# Patient Record
Sex: FEMALE | Race: WHITE | Employment: PART TIME
[De-identification: names, ages, dates, MRNs, and addresses within clinical notes are randomized per-mention and may not be internally consistent; named-entity substitution may affect disease eponyms.]

---

## 2017-01-10 ENCOUNTER — PROCEDURE VISIT (OUTPATIENT)
Dept: OBGYN | Facility: CLINIC | Age: 47
End: 2017-01-10

## 2017-01-10 VITALS
HEART RATE: 74 BPM | RESPIRATION RATE: 19 BRPM | DIASTOLIC BLOOD PRESSURE: 72 MMHG | BODY MASS INDEX: 26.67 KG/M2 | WEIGHT: 156.2 LBS | TEMPERATURE: 98.1 F | HEIGHT: 64 IN | SYSTOLIC BLOOD PRESSURE: 111 MMHG

## 2017-01-10 DIAGNOSIS — N93.9 ABNORMAL UTERINE BLEEDING (AUB): Primary | ICD-10-CM

## 2017-01-10 LAB
CONTROL: NORMAL
PREGNANCY TEST URINE, POC: NEGATIVE

## 2017-01-10 PROCEDURE — 81025 URINE PREGNANCY TEST: CPT | Performed by: OBSTETRICS & GYNECOLOGY

## 2017-01-10 PROCEDURE — 58100 BIOPSY OF UTERUS LINING: CPT | Performed by: OBSTETRICS & GYNECOLOGY

## 2017-01-10 RX ORDER — MEDROXYPROGESTERONE ACETATE 150 MG/ML
150 INJECTION, SUSPENSION INTRAMUSCULAR
Status: SHIPPED | OUTPATIENT
Start: 2017-01-10

## 2017-01-24 ENCOUNTER — OFFICE VISIT (OUTPATIENT)
Dept: OBGYN | Facility: CLINIC | Age: 47
End: 2017-01-24

## 2017-01-24 VITALS
SYSTOLIC BLOOD PRESSURE: 131 MMHG | HEIGHT: 63 IN | HEART RATE: 69 BPM | BODY MASS INDEX: 27.91 KG/M2 | DIASTOLIC BLOOD PRESSURE: 81 MMHG | WEIGHT: 157.5 LBS

## 2017-01-24 DIAGNOSIS — N93.9 ABNORMAL UTERINE BLEEDING (AUB): Primary | ICD-10-CM

## 2017-01-24 PROCEDURE — 99213 OFFICE O/P EST LOW 20 MIN: CPT | Performed by: OBSTETRICS & GYNECOLOGY

## 2017-02-20 ENCOUNTER — TELEPHONE (OUTPATIENT)
Dept: OBGYN | Facility: CLINIC | Age: 47
End: 2017-02-20

## 2017-03-28 ENCOUNTER — NURSE ONLY (OUTPATIENT)
Dept: OBGYN | Age: 47
End: 2017-03-28
Payer: COMMERCIAL

## 2017-03-28 VITALS — BODY MASS INDEX: 27.31 KG/M2 | RESPIRATION RATE: 15 BRPM | HEIGHT: 64 IN | WEIGHT: 160 LBS

## 2017-03-28 DIAGNOSIS — N93.9 ABNORMAL UTERINE BLEEDING (AUB): Primary | ICD-10-CM

## 2017-03-28 PROCEDURE — 96372 THER/PROPH/DIAG INJ SC/IM: CPT | Performed by: OBSTETRICS & GYNECOLOGY

## 2017-03-28 RX ORDER — MEDROXYPROGESTERONE ACETATE 150 MG/ML
150 INJECTION, SUSPENSION INTRAMUSCULAR ONCE
Status: COMPLETED | OUTPATIENT
Start: 2017-03-28 | End: 2017-03-28

## 2017-03-28 RX ADMIN — MEDROXYPROGESTERONE ACETATE 150 MG: 150 INJECTION, SUSPENSION INTRAMUSCULAR at 10:21

## 2017-04-11 ENCOUNTER — TELEPHONE (OUTPATIENT)
Dept: OBGYN | Age: 47
End: 2017-04-11

## 2017-05-01 ENCOUNTER — OFFICE VISIT (OUTPATIENT)
Dept: OBGYN | Age: 47
End: 2017-05-01
Payer: COMMERCIAL

## 2017-05-01 VITALS
DIASTOLIC BLOOD PRESSURE: 72 MMHG | BODY MASS INDEX: 26.4 KG/M2 | WEIGHT: 154.6 LBS | RESPIRATION RATE: 19 BRPM | TEMPERATURE: 98.4 F | SYSTOLIC BLOOD PRESSURE: 111 MMHG | HEIGHT: 64 IN | HEART RATE: 72 BPM

## 2017-05-01 DIAGNOSIS — N93.9 ABNORMAL UTERINE BLEEDING (AUB): Primary | ICD-10-CM

## 2017-05-01 PROCEDURE — 99213 OFFICE O/P EST LOW 20 MIN: CPT | Performed by: OBSTETRICS & GYNECOLOGY

## 2017-05-01 RX ORDER — CITALOPRAM 10 MG/1
10 TABLET ORAL
COMMUNITY
Start: 2017-02-16

## 2018-01-22 ENCOUNTER — HOSPITAL ENCOUNTER (OUTPATIENT)
Dept: WOMENS IMAGING | Age: 48
Discharge: HOME | End: 2018-01-22
Payer: COMMERCIAL

## 2018-01-22 DIAGNOSIS — Z12.31 VISIT FOR SCREENING MAMMOGRAM: ICD-10-CM

## 2018-01-22 PROCEDURE — 77063 BREAST TOMOSYNTHESIS BI: CPT

## 2019-02-25 ENCOUNTER — HOSPITAL ENCOUNTER (OUTPATIENT)
Dept: WOMENS IMAGING | Age: 49
Discharge: HOME | End: 2019-02-27
Payer: COMMERCIAL

## 2019-02-25 DIAGNOSIS — Z12.39 SCREENING BREAST EXAMINATION: ICD-10-CM

## 2019-02-25 PROCEDURE — 77067 SCR MAMMO BI INCL CAD: CPT

## 2019-03-26 ENCOUNTER — HOSPITAL ENCOUNTER (OUTPATIENT)
Dept: WOMENS IMAGING | Age: 49
Discharge: HOME | End: 2019-03-28
Payer: COMMERCIAL

## 2019-03-26 DIAGNOSIS — R92.8 ABNORMAL MAMMOGRAM: ICD-10-CM

## 2019-03-26 PROCEDURE — G0279 TOMOSYNTHESIS, MAMMO: HCPCS

## 2019-03-26 PROCEDURE — 76642 ULTRASOUND BREAST LIMITED: CPT

## 2020-01-08 ENCOUNTER — HOSPITAL ENCOUNTER (EMERGENCY)
Age: 50
Discharge: HOME | End: 2020-01-08
Attending: EMERGENCY MEDICINE
Payer: COMMERCIAL

## 2020-01-08 ENCOUNTER — APPOINTMENT (OUTPATIENT)
Dept: CT IMAGING | Age: 50
End: 2020-01-08
Payer: COMMERCIAL

## 2020-01-08 ENCOUNTER — APPOINTMENT (OUTPATIENT)
Dept: GENERAL RADIOLOGY | Age: 50
End: 2020-01-08
Payer: COMMERCIAL

## 2020-01-08 VITALS
HEIGHT: 64 IN | BODY MASS INDEX: 29.02 KG/M2 | DIASTOLIC BLOOD PRESSURE: 78 MMHG | WEIGHT: 170 LBS | RESPIRATION RATE: 16 BRPM | TEMPERATURE: 98.2 F | HEART RATE: 66 BPM | OXYGEN SATURATION: 95 % | SYSTOLIC BLOOD PRESSURE: 126 MMHG

## 2020-01-08 LAB
ABSOLUTE EOS #: 0.09 K/UL (ref 0–0.44)
ABSOLUTE IMMATURE GRANULOCYTE: 0.03 K/UL (ref 0–0.3)
ABSOLUTE LYMPH #: 2.24 K/UL (ref 1.1–3.7)
ABSOLUTE MONO #: 0.53 K/UL (ref 0.1–1.2)
ANION GAP SERPL CALCULATED.3IONS-SCNC: 14 MMOL/L (ref 9–17)
BASOPHILS # BLD: 0 % (ref 0–2)
BASOPHILS ABSOLUTE: 0.03 K/UL (ref 0–0.2)
BUN BLDV-MCNC: 12 MG/DL (ref 6–20)
BUN/CREAT BLD: ABNORMAL (ref 9–20)
CALCIUM SERPL-MCNC: 9.6 MG/DL (ref 8.6–10.4)
CHLORIDE BLD-SCNC: 105 MMOL/L (ref 98–107)
CO2: 22 MMOL/L (ref 20–31)
CREAT SERPL-MCNC: 0.69 MG/DL (ref 0.5–0.9)
DIFFERENTIAL TYPE: ABNORMAL
EOSINOPHILS RELATIVE PERCENT: 1 % (ref 1–4)
GFR AFRICAN AMERICAN: >60 ML/MIN
GFR NON-AFRICAN AMERICAN: >60 ML/MIN
GFR SERPL CREATININE-BSD FRML MDRD: ABNORMAL ML/MIN/{1.73_M2}
GFR SERPL CREATININE-BSD FRML MDRD: ABNORMAL ML/MIN/{1.73_M2}
GLUCOSE BLD-MCNC: 111 MG/DL (ref 70–99)
HCT VFR BLD CALC: 43.4 % (ref 36.3–47.1)
HEMOGLOBIN: 14.3 G/DL (ref 11.9–15.1)
IMMATURE GRANULOCYTES: 0 %
LYMPHOCYTES # BLD: 24 % (ref 24–43)
MCH RBC QN AUTO: 30.2 PG (ref 25.2–33.5)
MCHC RBC AUTO-ENTMCNC: 32.9 G/DL (ref 28.4–34.8)
MCV RBC AUTO: 91.6 FL (ref 82.6–102.9)
MONOCYTES # BLD: 6 % (ref 3–12)
NRBC AUTOMATED: 0 PER 100 WBC
PDW BLD-RTO: 12.8 % (ref 11.8–14.4)
PLATELET # BLD: 300 K/UL (ref 138–453)
PLATELET ESTIMATE: ABNORMAL
PMV BLD AUTO: 11.3 FL (ref 8.1–13.5)
POTASSIUM SERPL-SCNC: 3.9 MMOL/L (ref 3.7–5.3)
RBC # BLD: 4.74 M/UL (ref 3.95–5.11)
RBC # BLD: ABNORMAL 10*6/UL
SEG NEUTROPHILS: 69 % (ref 36–65)
SEGMENTED NEUTROPHILS ABSOLUTE COUNT: 6.49 K/UL (ref 1.5–8.1)
SODIUM BLD-SCNC: 141 MMOL/L (ref 135–144)
TROPONIN INTERP: NORMAL
TROPONIN INTERP: NORMAL
TROPONIN T: NORMAL NG/ML
TROPONIN T: NORMAL NG/ML
TROPONIN, HIGH SENSITIVITY: <6 NG/L (ref 0–14)
TROPONIN, HIGH SENSITIVITY: <6 NG/L (ref 0–14)
WBC # BLD: 9.4 K/UL (ref 3.5–11.3)
WBC # BLD: ABNORMAL 10*3/UL

## 2020-01-08 PROCEDURE — 71260 CT THORAX DX C+: CPT

## 2020-01-08 PROCEDURE — 80048 BASIC METABOLIC PNL TOTAL CA: CPT

## 2020-01-08 PROCEDURE — 6360000004 HC RX CONTRAST MEDICATION: Performed by: EMERGENCY MEDICINE

## 2020-01-08 PROCEDURE — 93005 ELECTROCARDIOGRAM TRACING: CPT | Performed by: EMERGENCY MEDICINE

## 2020-01-08 PROCEDURE — 99285 EMERGENCY DEPT VISIT HI MDM: CPT

## 2020-01-08 PROCEDURE — 71046 X-RAY EXAM CHEST 2 VIEWS: CPT

## 2020-01-08 PROCEDURE — 85025 COMPLETE CBC W/AUTO DIFF WBC: CPT

## 2020-01-08 PROCEDURE — 84484 ASSAY OF TROPONIN QUANT: CPT

## 2020-01-08 RX ADMIN — IOHEXOL 75 ML: 350 INJECTION, SOLUTION INTRAVENOUS at 11:41

## 2020-01-08 ASSESSMENT — ENCOUNTER SYMPTOMS
VOMITING: 0
ABDOMINAL PAIN: 0
COUGH: 0
RHINORRHEA: 0
EYE PAIN: 0
DIARRHEA: 0
SHORTNESS OF BREATH: 1
EYE DISCHARGE: 0
BACK PAIN: 0
NAUSEA: 0
COLOR CHANGE: 0
SORE THROAT: 0

## 2020-01-08 ASSESSMENT — PAIN SCALES - GENERAL: PAINLEVEL_OUTOF10: 0

## 2020-01-10 LAB
EKG ATRIAL RATE: 74 BPM
EKG P AXIS: 46 DEGREES
EKG P-R INTERVAL: 142 MS
EKG Q-T INTERVAL: 382 MS
EKG QRS DURATION: 72 MS
EKG QTC CALCULATION (BAZETT): 424 MS
EKG R AXIS: 38 DEGREES
EKG T AXIS: 63 DEGREES
EKG VENTRICULAR RATE: 74 BPM

## 2020-09-28 ENCOUNTER — HOSPITAL ENCOUNTER (OUTPATIENT)
Dept: WOMENS IMAGING | Age: 50
Discharge: HOME | End: 2020-09-30
Payer: COMMERCIAL

## 2020-09-28 PROCEDURE — 77063 BREAST TOMOSYNTHESIS BI: CPT

## 2022-02-14 ENCOUNTER — HOSPITAL ENCOUNTER (OUTPATIENT)
Dept: WOMENS IMAGING | Age: 52
Discharge: HOME | End: 2022-02-16
Payer: COMMERCIAL

## 2022-02-14 DIAGNOSIS — Z12.39 SCREENING BREAST EXAMINATION: ICD-10-CM

## 2022-02-14 PROCEDURE — 77063 BREAST TOMOSYNTHESIS BI: CPT

## 2023-07-11 ENCOUNTER — HOSPITAL ENCOUNTER (OUTPATIENT)
Dept: WOMENS IMAGING | Age: 53
Discharge: HOME | End: 2023-07-13
Payer: COMMERCIAL

## 2023-07-11 DIAGNOSIS — R92.8 ABNORMAL FINDING ON BREAST IMAGING: ICD-10-CM

## 2023-07-11 PROCEDURE — G0279 TOMOSYNTHESIS, MAMMO: HCPCS

## 2023-08-22 ENCOUNTER — HOSPITAL ENCOUNTER (OUTPATIENT)
Dept: WOMENS IMAGING | Age: 53
Discharge: HOME | End: 2023-08-24
Payer: COMMERCIAL

## 2023-08-22 VITALS — HEART RATE: 72 BPM | DIASTOLIC BLOOD PRESSURE: 92 MMHG | SYSTOLIC BLOOD PRESSURE: 141 MMHG

## 2023-08-22 DIAGNOSIS — R92.8 ABNORMAL MAMMOGRAM: ICD-10-CM

## 2023-08-22 PROCEDURE — 77065 DX MAMMO INCL CAD UNI: CPT

## 2023-08-22 PROCEDURE — 88342 IMHCHEM/IMCYTCHM 1ST ANTB: CPT

## 2023-08-22 PROCEDURE — 19081 BX BREAST 1ST LESION STRTCTC: CPT

## 2023-08-22 PROCEDURE — 88305 TISSUE EXAM BY PATHOLOGIST: CPT

## 2023-08-23 ENCOUNTER — TELEPHONE (OUTPATIENT)
Dept: WOMENS IMAGING | Age: 53
End: 2023-08-23

## 2023-08-23 NOTE — TELEPHONE ENCOUNTER
Patient contacted post biopsy of the left breast.  She is doing well with some tenderness. We discussed that using the Tylenol as directed on the bottle and keeping the breast supported in a bra is important.   Reviewed that she is to obtain the results of biopsy from the office of Breanna Sepulveda.  Offered assistance to her in the future should she need any. / Electronically signed by Barbara Schwab RT-R, M, CBIN on 8/23/2023 at 11:05 AM

## 2023-08-25 LAB — SURGICAL PATHOLOGY REPORT: NORMAL

## 2025-07-18 NOTE — ED PROVIDER NOTES
headaches. Psychiatric/Behavioral: Negative for suicidal ideas. The patient is not nervous/anxious. PAST MEDICAL HISTORY    has a past medical history of Allergic rhinitis and Endometriosis. SURGICAL HISTORY      has a past surgical history that includes Tubal ligation and Endometrial ablation. CURRENT MEDICATIONS       Current Discharge Medication List      CONTINUE these medications which have NOT CHANGED    Details   citalopram (CELEXA) 10 MG tablet Take 10 mg by mouth      buPROPion SR (WELLBUTRIN SR) 150 MG SR tablet Take 1 tablet by mouth 2 times daily  Qty: 60 tablet, Refills: 3    Associated Diagnoses: Tobacco dependence      nicotine (NICODERM CQ) 21 MG/24HR Place 1 patch onto the skin every 24 hours  Qty: 30 patch, Refills: 3    Associated Diagnoses: Tobacco dependence      nicotine polacrilex (NICORETTE) 2 MG gum Take 1 each by mouth as needed for Smoking cessation  Qty: 110 each, Refills: 3    Associated Diagnoses: Tobacco dependence      BIOTIN PO Take by mouth daily      b complex vitamins capsule Take 1 capsule by mouth daily      naproxen (NAPROSYN) 500 MG tablet Take 1 tablet by mouth 2 times daily (with meals)  Qty: 60 tablet, Refills: 3    Associated Diagnoses: Mass of hip region, right; Left ankle pain; Deformity, clavicle             ALLERGIES     is allergic to pcn [penicillins]; sulfa antibiotics; doxycycline; seasonal; and shellfish-derived products. FAMILY HISTORY     She indicated that her mother is alive. She indicated that her father is alive. She indicated that both of her sisters are alive. She indicated that her brother is alive. She indicated that her maternal grandmother is . She indicated that her maternal grandfather is . She indicated that her paternal grandmother is . She indicated that her paternal grandfather is .      family history includes Cancer in her mother and paternal grandmother; Cancer (age of onset: 61) in her maternal grandmother; Diabetes in her paternal grandmother. SOCIAL HISTORY      reports that she has been smoking cigarettes. She has a 7.50 pack-year smoking history. She has never used smokeless tobacco. She reports current alcohol use. She reports that she does not use drugs. PHYSICAL EXAM     INITIAL VITALS:  height is 5' 4\" (1.626 m) and weight is 170 lb (77.1 kg). Her oral temperature is 98.2 °F (36.8 °C). Her blood pressure is 126/78 and her pulse is 66. Her respiration is 16 and oxygen saturation is 95%. Physical Exam  Vitals signs and nursing note reviewed. Constitutional:       Appearance: Normal appearance. She is not ill-appearing. HENT:      Head: Normocephalic and atraumatic. Eyes:      Extraocular Movements: Extraocular movements intact. Conjunctiva/sclera: Conjunctivae normal.   Cardiovascular:      Rate and Rhythm: Normal rate and regular rhythm. Heart sounds: Normal heart sounds. Pulmonary:      Effort: Pulmonary effort is normal. No respiratory distress. Breath sounds: Normal breath sounds. No stridor. No wheezing, rhonchi or rales. Abdominal:      Palpations: Abdomen is soft. Tenderness: There is no tenderness. Musculoskeletal:      Right lower leg: No edema. Left lower leg: No edema. Skin:     General: Skin is warm and dry. Capillary Refill: Capillary refill takes less than 2 seconds. Neurological:      General: No focal deficit present. Mental Status: She is alert and oriented to person, place, and time. Psychiatric:         Mood and Affect: Mood normal.         Behavior: Behavior normal.         Thought Content: Thought content normal.         Judgment: Judgment normal.         DIFFERENTIAL DIAGNOSIS/ MDM:     Will get EKG, chest x-ray, labs. As patient was just recently started on hormone therapy and does smoke, will also get CT scan of the chest to rule out PE. Work-up here is unremarkable and patient says she is feeling better. Will discharge home with follow-up to primary care physician. DIAGNOSTIC RESULTS     EKG: All EKG's are interpreted by the Emergency Department Physician who either signs or Co-signs this chart in the absence of a cardiologist.    EKG Interpretation    Interpreted by emergency department physician    Rhythm: normal sinus   Rate: normal  Axis: normal  Ectopy: none  Conduction: normal  ST Segments: normal  T Waves: non specific changes  Q Waves: none    Clinical Impression: nonspecific    [place] Loop      RADIOLOGY:   I directly visualized the following  images and reviewed the radiologist interpretations:   Xr Chest Standard (2 Vw)    Result Date: 1/8/2020  EXAMINATION: TWO XRAY VIEWS OF THE CHEST 1/8/2020 11:05 am COMPARISON: None. HISTORY: ORDERING SYSTEM PROVIDED HISTORY: sob TECHNOLOGIST PROVIDED HISTORY: sob Reason for Exam: trouble breathing FINDINGS: Frontal and lateral views of the chest are submitted for review. The cardiac silhouette is normal in size. Lung parenchyma is clear without focal airspace consolidation, sizeable pleural effusion, or pneumothorax. Trachea is midline. Osseous structures and soft tissues are grossly intact. No acute cardiopulmonary pathology. Ct Chest Pulmonary Embolism W Contrast    Result Date: 1/8/2020  EXAMINATION: CTA OF THE CHEST 1/8/2020 11:40 am TECHNIQUE: CTA of the chest was performed after the administration of intravenous contrast.  Multiplanar reformatted images are provided for review. MIP images are provided for review. Dose modulation, iterative reconstruction, and/or weight based adjustment of the mA/kV was utilized to reduce the radiation dose to as low as reasonably achievable. COMPARISON: Chest radiograph today.  HISTORY: ORDERING SYSTEM PROVIDED HISTORY: sob, on hormonal therapy, smokes TECHNOLOGIST PROVIDED HISTORY: sob, on hormonal therapy, smokes Reason for Exam: poss sob Acuity: Unknown Type of Exam: Unknown FINDINGS: Pulmonary Arteries: Pulmonary arteries are adequately opacified for evaluation. No evidence of intraluminal filling defect to suggest pulmonary embolism. Main pulmonary artery is normal in caliber. Mediastinum: No evidence of mediastinal lymphadenopathy. The heart and pericardium demonstrate no acute abnormality. There is no acute abnormality of the thoracic aorta. Lungs/pleura: Expiratory phase of imaging with findings of mild relaxation atelectasis. No focal consolidation or pulmonary edema. No evidence of pleural effusion or pneumothorax. Upper Abdomen: Limited images of the upper abdomen are unremarkable. Soft Tissues/Bones: No acute bone or soft tissue abnormality. No evidence of pulmonary embolism or acute pulmonary abnormality. ED BEDSIDE ULTRASOUND:   Not indicated    LABS:  Labs Reviewed   BASIC METABOLIC PANEL - Abnormal; Notable for the following components:       Result Value    Glucose 111 (*)     All other components within normal limits   CBC WITH AUTO DIFFERENTIAL - Abnormal; Notable for the following components:    Seg Neutrophils 69 (*)     All other components within normal limits   TROPONIN   TROPONIN       unremarkable    EMERGENCY DEPARTMENT COURSE:   Vitals:    Vitals:    01/08/20 1004 01/08/20 1045   BP: (!) 144/98 126/78   Pulse: 85 66   Resp: 20 16   Temp: 98.2 °F (36.8 °C)    TempSrc: Oral    SpO2: 100% 95%   Weight: 170 lb (77.1 kg)    Height: 5' 4\" (1.626 m)      -------------------------  BP: 126/78, Temp: 98.2 °F (36.8 °C), Pulse: 66, Resp: 16    wnl      FINAL IMPRESSION      1.  Shortness of breath          DISPOSITION/PLAN     D/C to home    PATIENT REFERRED TO:  DUTCH Brito - CNP  [place], [id number] [address]  [phone]    Call today        DISCHARGE MEDICATIONS:  Current Discharge Medication List          (Please note that portions of this note were completed with a voice recognition program.  Efforts were made to edit the dictations but occasionally Patient is a 58-year-old female who has psoriatic arthritis, psoriatic rash.  Presents emergency room with right gluteal/buttocks pain with an abscess for the past 4 days.  She denies cough fever chills.  She denies hematemesis or hematochezia.  She denies rectal discharge.  Denies any trauma.  She denies any prior history of abscesses.  She denies abdominal pain.    On evaluation is a thin female awake alert and oriented in no distress.  HEENT is unremarkable.  No G/F/R.  Cardiopulmonary examination is normal.  Abdomen is soft and nontender without guarding or rebound.  Patient has on her skin on the right gluteal fold a perirectal abscess that is tender mildly fluctuant with surrounding erythema.  It is not become pointed yet.  According to the PA the digital rectal exam is nontender.  Without any evidence of infection going deeply.    Plan of care includes antibiotics, CT imaging to rule out deep space infection, labs, pain medicine and IV fluids and disposition accordingly.  Will obtain surgical consult if needed.  This chart was made with dictation software and may contain typographical errors.